# Patient Record
Sex: MALE | Race: WHITE
[De-identification: names, ages, dates, MRNs, and addresses within clinical notes are randomized per-mention and may not be internally consistent; named-entity substitution may affect disease eponyms.]

---

## 2018-07-16 ENCOUNTER — HOSPITAL ENCOUNTER (EMERGENCY)
Dept: HOSPITAL 58 - ED | Age: 43
Discharge: HOME | End: 2018-07-16

## 2018-07-16 VITALS — SYSTOLIC BLOOD PRESSURE: 141 MMHG | DIASTOLIC BLOOD PRESSURE: 94 MMHG | TEMPERATURE: 99.3 F

## 2018-07-16 VITALS — BODY MASS INDEX: 39.2 KG/M2

## 2018-07-16 DIAGNOSIS — M54.6: Primary | ICD-10-CM

## 2018-07-16 DIAGNOSIS — F17.210: ICD-10-CM

## 2018-07-16 PROCEDURE — 99283 EMERGENCY DEPT VISIT LOW MDM: CPT

## 2018-07-16 PROCEDURE — 96372 THER/PROPH/DIAG INJ SC/IM: CPT

## 2018-07-16 NOTE — ED.PDOC
General


ED Provider: 


Dr. BILL ANTONIO





Chief Complaint: Chest Wall Injury/Pain


Stated Complaint: Back and side pain; since 2 days.  Wants pain relief.  Denies 

injuries


Time Seen by Physician: 16:37


Mode of Arrival: Walk-In


Information Source: Patient


Exam Limitations: No limitations


Nursing and Triage Documentation Reviewed and Agree: Yes


Does patient meet sepsis criteria?: No


System Inflammatory Response Syndrome: Not Applicable


Sepsis Protocol: 


For patient's 13 years and over:





Temp is 96.8 and below  and greater


Pulse >90 BPM


Resp >20/minute


Acutely Altered Mental Status





Are patient's symptoms suggestive of a new infection, such as:


   -Pneumonia


   -Skin, Soft Tissue


   -Endocarditis


   -UTI


   -Bone, Joint Infection


   -Implantable Device


   -Acute Abdominal Infection


   -Wound Infection


   -Meningitis


   -Blood Stream Catheter Infection


   -Unknown








Review of Systems





- Review Of Systems


Constitutional: Reports: No symptoms


Respiratory: Reports: Cough (hurts when coughing)


Musculoskeletal: Reports: Back pain, Muscle pain (lower Right shoulder blade 

and Right posterior side)


Endocrine: Reports: No symptoms


All Other Systems: Reviewed and Negative





Past Medical History





- Past Medical History


Previously Healthy: Yes


Endocrine: Reports: None


Cardiovascular: Reports: Hypertension


Respiratory: Reports: None


Hematological: Reports: None


Gastrointestinal: Reports: Gallstones


Genitourinary: Reports: None


Neuro/Psych: Reports: None


Musculoskeletal: Reports: Back Pain (Chronic; percocet)


Cancer: Reports: None





- Surgical History


General Surgical History: Reports: Cholecystectomy





- Family History


Family History: Reports: Unknown





- Social History


Smoking Status: Current every day smoker, Heavy tobacco smoker


Hx Substance Use: No


Alcohol Screening: Occasionally





Physical Exam





- Physical Exam


Appearance: Well-appearing


Neck: Supple


Respiratory: Airway patent, Breath sounds clear, Breath sounds equal


Cardiovascular: RRR


Musculoskeletal: Normal strength, ROM intact


Skin: Warm, Dry, Normal color


Neurological: Sensation intact, Motor intact


Psychiatric: Affect appropriate, Mood appropriate





Interpretation





- Radiology Interpretation


Radiology Interpretation By: ED Physician


Radiology Results: Negative


Exam Interpreted: CXR (No acute changes)





Critical Care Note





- Critical Care Note


Total Time (mins): 10





Course





- Course


Orders, Labs, Meds: 


Orders











 Category Date Time Status


 


 Hydromorphone HCl [Dilaudid] MEDS  07/16/18 17:55 Discontinued





 1 mg IM ONCE STA   


 


 Ondansetron [Zofran Odt] MEDS  07/16/18 17:57 Discontinued





 4 mg PO ONCE STA   


 


 CHEST, 2 VIEWS PA & LAT Stat RADS  07/16/18 16:41 Ordered








Medications














Discontinued Medications














Generic Name Dose Route Start Last Admin





  Trade Name [place]  PRN Reason Stop Dose Admin


 


Hydromorphone HCl  1 mg  07/16/18 17:55  07/16/18 18:08





  Dilaudid  IM  07/16/18 17:56  1 mg





  ONCE STA   Administration





     





     





     





     


 


Ondansetron HCl  4 mg  07/16/18 17:57  07/16/18 18:08





  Zofran Odt  PO  07/16/18 17:58  4 mg





  ONCE STA   Administration





     





     





     





     











Vital Signs: 


 











  Temp Pulse Resp BP Pulse Ox


 


 07/16/18 16:29  99.3 F  93 H  20  141/94 H  98














Departure





- Departure


Time of Disposition: 18:15


Disposition: HOME SELF-CARE


Discharge Problem: 


 Musculoskeletal back pain





Instructions:  Musculoskeletal Pain (ED)


Condition: Good


Pt referred to PMD for follow-up: Yes (Follow up with primary care)


IPMP verified?: Yes (No prescriptions found for [place]; has percocet from [place] per pt 

history)


Additional Instructions: 


Continue to use your Percocet as prescribed; rest.  Heat may help





Allergies/Adverse Reactions: 


Allergies





ketorolac [From Toradol] Adverse Reaction (Verified 07/16/18 16:33)


 


morphine Adverse Reaction (Verified 07/16/18 16:33)


 


tramadol Adverse Reaction (Verified 07/16/18 16:33)


 








Home Medications: 


Ambulatory Orders





Gabapentin [Neurontin] 800 mg PO TID 07/16/18 


Lisinopril [Zestril] 10 mg PO DAILY 07/16/18 


Oxycodone-Acetaminophen  [Percocet ] 1 tab PO TID 07/16/18 








Disposition Discussed With: Patient

## 2018-07-17 NOTE — DI
EXAM:  Chest, 2 views. 

  

HISTORY:  Right-sided pain 

  

COMPARISON:  None. 

  

FINDING/IMPRESSION:  Cardiomediastinal countours appear within normal limits.  There is no focal pulm
onary consolidation.  No pleural effusion or pneumothorax. 

  

No acute cardiopulmonary process.

## 2018-07-18 ENCOUNTER — HOSPITAL ENCOUNTER (EMERGENCY)
Dept: HOSPITAL 58 - ED | Age: 43
Discharge: HOME | End: 2018-07-18

## 2018-07-18 VITALS — TEMPERATURE: 97.5 F | DIASTOLIC BLOOD PRESSURE: 89 MMHG | SYSTOLIC BLOOD PRESSURE: 143 MMHG

## 2018-07-18 VITALS — BODY MASS INDEX: 38.9 KG/M2

## 2018-07-18 DIAGNOSIS — S46.911D: Primary | ICD-10-CM
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
## 2018-07-18 DIAGNOSIS — F17.210: ICD-10-CM

## 2018-07-18 PROCEDURE — 96372 THER/PROPH/DIAG INJ SC/IM: CPT

## 2018-07-18 PROCEDURE — 99282 EMERGENCY DEPT VISIT SF MDM: CPT

## 2018-07-18 NOTE — ED.PDOC
General


ED Provider: 


Dr. BILL ANTONIO





Chief Complaint: Chest Wall Injury/Pain


Stated Complaint: Right chest and right posterior shoulder blade; no change 

from Monday ER evaluation


Time Seen by Physician: 17:20


Mode of Arrival: Walk-In


Information Source: Patient


Exam Limitations: No limitations


Nursing and Triage Documentation Reviewed and Agree: Yes


Does patient meet sepsis criteria?: No


System Inflammatory Response Syndrome: Not Applicable


Sepsis Protocol: 


For patient's 13 years and over:





Temp is 96.8 and below  and greater


Pulse >90 BPM


Resp >20/minute


Acutely Altered Mental Status





Are patient's symptoms suggestive of a new infection, such as:


   -Pneumonia


   -Skin, Soft Tissue


   -Endocarditis


   -UTI


   -Bone, Joint Infection


   -Implantable Device


   -Acute Abdominal Infection


   -Wound Infection


   -Meningitis


   -Blood Stream Catheter Infection


   -Unknown








Review of Systems





- Review Of Systems


Constitutional: Reports: No symptoms


Respiratory: Reports: No symptoms


Musculoskeletal: Reports: Back pain, Muscle pain (Posterior lateral R chest 

muscles and back/shoulder blade muscles), Muscle stiffness


All Other Systems: Reviewed and Negative





Past Medical History





- Past Medical History


Previously Healthy: Yes


Endocrine: Reports: None


Cardiovascular: Reports: Hypertension


Respiratory: Reports: None


Hematological: Reports: None


Gastrointestinal: Reports: Gallstones


Genitourinary: Reports: None


Neuro/Psych: Reports: None


Musculoskeletal: Reports: Back Pain (Chronic; percocet)


Cancer: Reports: None





- Surgical History


General Surgical History: Reports: Cholecystectomy





- Family History


Family History: Reports: Unknown





- Social History


Smoking Status: Current every day smoker, Heavy tobacco smoker


Hx Substance Use: No


Alcohol Screening: Occasionally





Physical Exam





- Physical Exam


Appearance: Well-appearing


Pain Distress: Moderate (minimal while seated comfortably; pain is with lifting 

and twisting motions)


Respiratory: Respirations nonlabored


Musculoskeletal: No edema (Trigger spots Right scapula - medial aspect and 

inferior lateral aspect)


Skin: Warm, Dry, Normal color


Neurological: Sensation intact, Motor intact (Upper extremity WNL all movements 

- neurologically intact), Alert, Oriented


Psychiatric: Affect appropriate, Mood appropriate





Critical Care Note





- Critical Care Note


Total Time (mins): 12





Course





- Course


Orders, Labs, Meds: 


Orders











 Category Date Time Status


 


 Hydromorphone HCl [Dilaudid] MEDS  07/18/18 17:36 Discontinued





 1 mg IM ONCE STA   








Medications














Discontinued Medications














Generic Name Dose Route Start Last Admin





  Trade Name Freq  PRN Reason Stop Dose Admin


 


Hydromorphone HCl  1 mg  07/18/18 17:36  07/18/18 17:42





  Dilaudid  IM  07/18/18 17:37  1 mg





  ONCE STA   Administration





     





     





     





     











Vital Signs: 


 











  Temp Pulse Resp BP Pulse Ox


 


 07/18/18 16:06  97.5 F L  80  18  143/89 H  97














Departure





- Departure


Time of Disposition: 17:55


Disposition: HOME SELF-CARE


Discharge Problem: 


Muscle strain of right scapular region


Qualifiers:


 Encounter type: subsequent encounter Qualified Code(s): S46.911D - Strain of 

unspecified muscle, fascia and tendon at shoulder and upper arm level, right arm

, subsequent encounter





Instructions:  Muscle Strain (ED)


Condition: Stable


Pt referred to PMD for follow-up: Yes (Call for followup)


IPMP verified?: Yes (On Monday the 16th of July)


Additional Instructions: 


Take your pain medications as well as the muscle relaxant now prescribed.  

Follow up as needed with Primary Care.


Prescriptions: 


Methocarbamol [Robaxin] 1 - 2 tab PO Q6HR PRN #20 tablet


 PRN Reason: Spasms


Allergies/Adverse Reactions: 


Allergies





ketorolac [From Toradol] Adverse Reaction (Verified 07/18/18 16:11)


 


morphine Adverse Reaction (Verified 07/18/18 16:11)


 


tramadol Adverse Reaction (Verified 07/18/18 16:11)


 








Home Medications: 


Ambulatory Orders





Gabapentin [Neurontin] 800 mg PO TID 07/16/18 


Lisinopril [Zestril] 10 mg PO DAILY 07/16/18 


Oxycodone-Acetaminophen  [Percocet ] 1 tab PO TID 07/16/18 


Methocarbamol [Robaxin] 1 - 2 tab PO Q6HR PRN #20 tablet 07/18/18 








Disposition Discussed With: Patient (Discussed at length with patient - no 

indication of anything other than a muscle strain including with reevaluation 

of trigger areas on R scapula.)